# Patient Record
Sex: MALE | Race: WHITE | Employment: OTHER | URBAN - METROPOLITAN AREA
[De-identification: names, ages, dates, MRNs, and addresses within clinical notes are randomized per-mention and may not be internally consistent; named-entity substitution may affect disease eponyms.]

---

## 2020-12-07 ENCOUNTER — APPOINTMENT (OUTPATIENT)
Dept: GENERAL RADIOLOGY | Facility: HOSPITAL | Age: 65
End: 2020-12-07
Attending: EMERGENCY MEDICINE
Payer: COMMERCIAL

## 2020-12-07 ENCOUNTER — HOSPITAL ENCOUNTER (EMERGENCY)
Facility: HOSPITAL | Age: 65
Discharge: HOME | End: 2020-12-07
Attending: EMERGENCY MEDICINE
Payer: COMMERCIAL

## 2020-12-07 ENCOUNTER — PATIENT OUTREACH (OUTPATIENT)
Dept: CASE MANAGEMENT | Age: 65
End: 2020-12-07

## 2020-12-07 ENCOUNTER — APPOINTMENT (OUTPATIENT)
Dept: CT IMAGING | Facility: HOSPITAL | Age: 65
End: 2020-12-07
Attending: EMERGENCY MEDICINE
Payer: COMMERCIAL

## 2020-12-07 VITALS
RESPIRATION RATE: 17 BRPM | OXYGEN SATURATION: 95 % | TEMPERATURE: 100 F | BODY MASS INDEX: 28.88 KG/M2 | WEIGHT: 225 LBS | HEIGHT: 74 IN | DIASTOLIC BLOOD PRESSURE: 70 MMHG | SYSTOLIC BLOOD PRESSURE: 126 MMHG | HEART RATE: 96 BPM

## 2020-12-07 DIAGNOSIS — U07.1 PNEUMONIA DUE TO COVID-19 VIRUS: Primary | ICD-10-CM

## 2020-12-07 DIAGNOSIS — J12.82 PNEUMONIA DUE TO COVID-19 VIRUS: Primary | ICD-10-CM

## 2020-12-07 PROCEDURE — 99285 EMERGENCY DEPT VISIT HI MDM: CPT

## 2020-12-07 PROCEDURE — 96361 HYDRATE IV INFUSION ADD-ON: CPT

## 2020-12-07 PROCEDURE — 82728 ASSAY OF FERRITIN: CPT | Performed by: EMERGENCY MEDICINE

## 2020-12-07 PROCEDURE — 71275 CT ANGIOGRAPHY CHEST: CPT | Performed by: EMERGENCY MEDICINE

## 2020-12-07 PROCEDURE — 83615 LACTATE (LD) (LDH) ENZYME: CPT | Performed by: EMERGENCY MEDICINE

## 2020-12-07 PROCEDURE — 85025 COMPLETE CBC W/AUTO DIFF WBC: CPT | Performed by: EMERGENCY MEDICINE

## 2020-12-07 PROCEDURE — 71045 X-RAY EXAM CHEST 1 VIEW: CPT | Performed by: EMERGENCY MEDICINE

## 2020-12-07 PROCEDURE — 96360 HYDRATION IV INFUSION INIT: CPT

## 2020-12-07 PROCEDURE — 86140 C-REACTIVE PROTEIN: CPT | Performed by: EMERGENCY MEDICINE

## 2020-12-07 PROCEDURE — 84145 PROCALCITONIN (PCT): CPT | Performed by: EMERGENCY MEDICINE

## 2020-12-07 PROCEDURE — 80053 COMPREHEN METABOLIC PANEL: CPT | Performed by: EMERGENCY MEDICINE

## 2020-12-07 PROCEDURE — 99284 EMERGENCY DEPT VISIT MOD MDM: CPT

## 2020-12-07 RX ORDER — OMEPRAZOLE 20 MG/1
20 CAPSULE, DELAYED RELEASE ORAL NIGHTLY
COMMUNITY
End: 2021-06-10

## 2020-12-07 RX ORDER — ACETAMINOPHEN 500 MG
1000 TABLET ORAL ONCE
Status: COMPLETED | OUTPATIENT
Start: 2020-12-07 | End: 2020-12-07

## 2020-12-07 NOTE — PROGRESS NOTES
Home Monitoring Condition Update    Covid19+ test date: 12/1/2020      Consent Verification:  Assessment Completed With: Patient  HIPAA Verified?   Yes    COVID-19 HOME MONITORING 12/7/2020   Short of breath Yes   Change in shortness of breath Same   [facility] monitoring. Patient advised to inform their Employee Health department or Manager when they have tested positive for COVID-19.       The patient was also directed to continue to isolate away from other household members when possible and stay completely

## 2020-12-07 NOTE — ED INITIAL ASSESSMENT (HPI)
Pt to ED via EMS from home c/o SOB, cough r/t Positive COVID after Thanksgiving. PT arrives on 2 L NC report from EMS 92% O2sat on RA. NKDA. Denies CP.

## 2020-12-07 NOTE — ED PROVIDER NOTES
Patient Seen in: [facility] Emergency Department      History   Patient presents with:  Difficulty Breathing  Cough/URI  Covid    Stated Complaint: SOn and cough r/t Positive COVID    HPI    19-year-old with a history of GERD presents for evaluation BMI 28.89 kg/m²         Physical Exam    General: Patient is awake, alert in no acute distress. HEENT:  Sclera are not icteric. Conjunctivae within normal limits.     Cardiovascular: Regular rate and rhythm  Respiratory: Occasional cough, no conversatio pneumonia  CTA chest: No PE. Dense consolidation both lung bases additional diffuse patchy groundglass opacities throughout both lungs. Consistent with multifocal pneumonia including COVID-19.      Patient did become dyspneic with exertion however was not

## 2020-12-07 NOTE — ED NOTES
[facility] CALLED FOR TRANSPORT BACK HOME, ETA IS 1145 DUE TO + COVID AND WILL NEED A BLS AT A MEDICARE RATE PER [facility], RN INFORMED

## 2020-12-07 NOTE — PATIENT INSTRUCTIONS
Patient Instructions for Home Pulse Oximetry due to COVID-19 Pneumonia  Overview and Terminology  A pulse oximeter a small device that clips on a finger and measures oxygen saturation levels along with your heart rate.  It works by passing small beams of [id number]         [facility] Monitoring Program    Your provider has placed an order for the [address] Monitoring program.  Silvestre Hammonds will be receiving a follow up phone call during business hours, soon after discharge.   If

## 2020-12-08 ENCOUNTER — TELEMEDICINE (OUTPATIENT)
Dept: INTERNAL MEDICINE CLINIC | Facility: CLINIC | Age: 65
End: 2020-12-08
Payer: COMMERCIAL

## 2020-12-08 DIAGNOSIS — U07.1 COVID-19: Primary | ICD-10-CM

## 2020-12-08 PROCEDURE — 99203 OFFICE O/P NEW LOW 30 MIN: CPT | Performed by: CLINICAL NURSE SPECIALIST

## 2020-12-08 NOTE — PROGRESS NOTES
Video Visit  [employer]    Telehealth outside of [address] Verbal Consent   I conducted a telehealth visit with Nicanor Levi today, 12/08/20, which was completed using two-way, real-time interactive audio and vid Diagnosis:  COVID 19 PNA. Arloa Lanes was discharge with a referral to the [facility] for continued follow up.   Today, the patient states he continues to feel fatigued, has a productive cough of clear colored sputum, has a pulse ox reading of 95% on RA and is resolution. If clinical symptoms persist then consider CT.     Dictated by (CST): Honey Olivia MD on 12/07/2020 at 8:08 AM     Finalized by (CST): Honey Olivia MD on 12/07/2020 at 8:08 AM         Lab Results   Component Value Date/Time    WBC 6.5 12/07/2020 07 10/17/1958  Annual Depression Screen due on 10/17/1969  FIT Colorectal Screening due on 10/17/2007  Colonoscopy due on 10/17/2007  PSA due on 10/17/2007  Zoster Vaccines(1 of 2) due on 10/17/2007  Influenza Vaccine(1) due on 10/01/2020  Pneumococcal Vaccin [place]  [facility]/ [facility], [place] [id number]  [address], APRN, 12/8/2020  [address] [place] [address]  [phone]

## 2020-12-08 NOTE — PROGRESS NOTES
TRANSITIONAL CARE CLINIC PHARMACIST MEDICATION RECONCILIATION        Jessica Beck MRN [id number]    10/17/1955 PCP No primary care provider on file.        Comments: Medication history completed by the [address] [place] [address] Pharmacist with the [facility]

## 2020-12-09 ENCOUNTER — PATIENT OUTREACH (OUTPATIENT)
Dept: CASE MANAGEMENT | Age: 65
End: 2020-12-09

## 2020-12-10 ENCOUNTER — PATIENT OUTREACH (OUTPATIENT)
Dept: CASE MANAGEMENT | Age: 65
End: 2020-12-10

## 2020-12-10 NOTE — PROGRESS NOTES
Home Monitoring Condition Update    Covid19+ test date: 12/1/20      Consent Verification:  Assessment Completed With: Patient  HIPAA Verified?   Yes    COVID-19 HOME MONITORING 12/10/2020   Temperature 97   Reading From Ear   SPO2 95   Pulse taken from P inform their Employee Health department or Manager when they have tested positive for COVID-19.       The patient was also directed to continue to isolate away from other household members when possible and stay completely isolated from the general public 3

## 2020-12-11 ENCOUNTER — PATIENT OUTREACH (OUTPATIENT)
Dept: CASE MANAGEMENT | Age: 65
End: 2020-12-11

## 2020-12-11 NOTE — PROGRESS NOTES
Patient called [facility] back and left voicemail. Called patient and spoke with him. Patient was asking about isolation, contagious period and returning to work.   Advised patient of current CDC guidelines  Also advised patient that he can be cl

## 2020-12-11 NOTE — PROGRESS NOTES
Home Monitoring Condition Update    Covid19+ test date: 12/1/2020      Consent Verification:  Assessment Completed With: Patient  HIPAA Verified?   Yes    COVID-19 HOME MONITORING 12/11/2020   Temperature (No Data)   Reading From -   SPO2 97   Pulse 50 inform their Employee Health department or Manager when they have tested positive for COVID-19.       The patient was also directed to continue to isolate away from other household members when possible and stay completely isolated from the general public 3

## 2020-12-14 ENCOUNTER — PATIENT OUTREACH (OUTPATIENT)
Dept: CASE MANAGEMENT | Age: 65
End: 2020-12-14

## 2020-12-14 NOTE — PROGRESS NOTES
Home Monitoring Condition Update    Covid19+ test date: 12/1/20      Consent Verification:  Assessment Completed With: Patient  HIPAA Verified?   Yes    COVID-19 HOME MONITORING 12/14/2020   Temperature 98   Reading From Ear   SPO2 98   Pulse 51   Pulse t inform their Employee Health department or Manager when they have tested positive for COVID-19.       The patient was also directed to continue to isolate away from other household members when possible and stay completely isolated from the general public 3

## 2020-12-15 ENCOUNTER — PATIENT OUTREACH (OUTPATIENT)
Dept: CASE MANAGEMENT | Age: 65
End: 2020-12-15

## 2020-12-15 NOTE — PROGRESS NOTES
Home Monitoring Condition Update    Covid19+ test date: 12/1/2020      Consent Verification:  Assessment Completed With: Patient  HIPAA Verified?   Yes    COVID-19 HOME MONITORING 12/15/2020   Temperature (No Data)   Reading From -   SPO2 98   Pulse 63 their Employee Health department or Manager when they have tested positive for COVID-19.       The patient was also directed to continue to isolate away from other household members when possible and stay completely isolated from the general public 3 days a

## 2020-12-16 ENCOUNTER — TELEMEDICINE (OUTPATIENT)
Dept: INTERNAL MEDICINE CLINIC | Facility: CLINIC | Age: 65
End: 2020-12-16
Payer: COMMERCIAL

## 2020-12-16 ENCOUNTER — TELEPHONE (OUTPATIENT)
Dept: CASE MANAGEMENT | Age: 65
End: 2020-12-16

## 2020-12-16 ENCOUNTER — TELEPHONE (OUTPATIENT)
Dept: INTERNAL MEDICINE CLINIC | Facility: CLINIC | Age: 65
End: 2020-12-16

## 2020-12-16 DIAGNOSIS — U07.1 PNEUMONIA DUE TO COVID-19 VIRUS: Primary | ICD-10-CM

## 2020-12-16 DIAGNOSIS — J12.82 PNEUMONIA DUE TO COVID-19 VIRUS: Primary | ICD-10-CM

## 2020-12-16 PROCEDURE — 99213 OFFICE O/P EST LOW 20 MIN: CPT | Performed by: INTERNAL MEDICINE

## 2020-12-16 NOTE — TELEPHONE ENCOUNTER
Patient had virtual visit today.      Please advise if patient is to continue home monitoring program (and indicate frequency of calls - daily, every other day, etc) and duration of days to be contacted and when next virtual visit should be scheduled for st

## 2020-12-16 NOTE — TELEPHONE ENCOUNTER
Please assist in routing to [facility] Home Monitoring Pool\" as I cannot direct myself:    No further follow-up with patient necessary.

## 2020-12-16 NOTE — TELEPHONE ENCOUNTER
Patient had a doxemity appointment with Dr Lexi Stevens today  He needs a note to return to work today  Please advise

## 2020-12-17 ENCOUNTER — TELEPHONE (OUTPATIENT)
Dept: CASE MANAGEMENT | Age: 65
End: 2020-12-17

## 2020-12-17 ENCOUNTER — TELEPHONE (OUTPATIENT)
Dept: INTERNAL MEDICINE CLINIC | Facility: CLINIC | Age: 65
End: 2020-12-17

## 2020-12-17 NOTE — TELEPHONE ENCOUNTER
Pt called [facility], [address] Dept inquiring about return to work letter that was supposed to be sent to him via [address] Pt was advised this is not Dr. Dean Xiao office, was given the correct phone number and was transferred.

## 2020-12-17 NOTE — TELEPHONE ENCOUNTER
Message          ----- Message -----   From: Edita Shaw   Sent: 12/16/2020   8:23 PM CST   To: [employer] Customer Response Pool   Subject: Work Release                                       Topic: Other      I need a release to return to work dated CADEN Alba

## 2021-06-10 PROBLEM — R22.42 LOCALIZED SWELLING OF LEFT LOWER LEG: Status: ACTIVE | Noted: 2021-06-10

## 2021-06-10 PROBLEM — K58.0 IRRITABLE BOWEL SYNDROME WITH DIARRHEA: Status: ACTIVE | Noted: 2021-06-10

## 2021-06-10 PROBLEM — Z00.00 ROUTINE GENERAL MEDICAL EXAMINATION AT A HEALTH CARE FACILITY: Status: ACTIVE | Noted: 2021-06-10

## 2021-06-10 PROBLEM — G47.00 INSOMNIA, UNSPECIFIED TYPE: Status: ACTIVE | Noted: 2021-06-10

## 2021-06-10 PROBLEM — K21.9 GASTROESOPHAGEAL REFLUX DISEASE WITHOUT ESOPHAGITIS: Status: ACTIVE | Noted: 2021-06-10

## 2021-06-27 ENCOUNTER — HOSPITAL ENCOUNTER (EMERGENCY)
Age: 66
Discharge: HOME | End: 2021-06-27
Attending: EMERGENCY MEDICINE
Payer: COMMERCIAL

## 2021-06-27 VITALS
BODY MASS INDEX: 30.8 KG/M2 | HEART RATE: 55 BPM | RESPIRATION RATE: 16 BRPM | TEMPERATURE: 97 F | WEIGHT: 240 LBS | OXYGEN SATURATION: 97 % | HEIGHT: 74 IN | DIASTOLIC BLOOD PRESSURE: 68 MMHG | SYSTOLIC BLOOD PRESSURE: 152 MMHG

## 2021-06-27 VITALS
DIASTOLIC BLOOD PRESSURE: 74 MMHG | TEMPERATURE: 98 F | OXYGEN SATURATION: 98 % | BODY MASS INDEX: 30.8 KG/M2 | SYSTOLIC BLOOD PRESSURE: 151 MMHG | HEART RATE: 56 BPM | WEIGHT: 240 LBS | HEIGHT: 74 IN | RESPIRATION RATE: 16 BRPM

## 2021-06-27 DIAGNOSIS — K08.89 PAIN, DENTAL: Primary | ICD-10-CM

## 2021-06-27 PROCEDURE — 99283 EMERGENCY DEPT VISIT LOW MDM: CPT

## 2021-06-27 PROCEDURE — 64450 NJX AA&/STRD OTHER PN/BRANCH: CPT

## 2021-06-27 RX ORDER — AMOXICILLIN 500 MG/1
500 TABLET, FILM COATED ORAL 3 TIMES DAILY
Qty: 30 TABLET | Refills: 0 | Status: SHIPPED | OUTPATIENT
Start: 2021-06-27 | End: 2021-07-07

## 2021-06-27 RX ORDER — HYDROCODONE BITARTRATE AND ACETAMINOPHEN 5; 325 MG/1; MG/1
1-2 TABLET ORAL EVERY 6 HOURS PRN
Qty: 10 TABLET | Refills: 0 | Status: SHIPPED | OUTPATIENT
Start: 2021-06-27 | End: 2021-06-27

## 2021-06-27 RX ORDER — HYDROCODONE BITARTRATE AND ACETAMINOPHEN 5; 325 MG/1; MG/1
1 TABLET ORAL ONCE
Status: COMPLETED | OUTPATIENT
Start: 2021-06-27 | End: 2021-06-27

## 2021-06-27 RX ORDER — HYDROCODONE BITARTRATE AND ACETAMINOPHEN 5; 325 MG/1; MG/1
1-2 TABLET ORAL EVERY 6 HOURS PRN
Qty: 10 TABLET | Refills: 0 | Status: SHIPPED | OUTPATIENT
Start: 2021-06-27 | End: 2021-07-04

## 2021-06-27 NOTE — ED PROVIDER NOTES
Patient Seen in: THE [facility] Emergency Department In [place]      History   Patient presents with:  Dental Problem    Stated Complaint: pt seen here earlier today for a dental abscess, c/o worsening pain     HPI/Subjective:   HPI    30-year-old male compla crown is very tender to palpation there is minimal erythema around the alveolar mucosa. There is no external facial swelling the neck is supple is no nuchal rigidity lymphadenopathy.   Lungs are clear cardiovascular exam shows regular rate and rhythm witho

## 2021-06-27 NOTE — ED PROVIDER NOTES
Patient Seen in: THE [facility] Emergency Department In [place]      History   Patient presents with:  Dental Problem    Stated Complaint: tooth pain     HPI/Subjective:   HPI    63 with a history of GERD presents for evaluation of tooth pain.   4 months ago, intact. Sclera are not icteric. Conjunctivae within normal limits. No trismus. No stridor. Phonation is normal.  No swelling to the neck or face, no erythema to the neck or face.   There is tenderness to percussion to tooth #31 with some periapical deon

## 2021-09-28 PROBLEM — L30.9 ECZEMA, UNSPECIFIED TYPE: Status: ACTIVE | Noted: 2021-09-28

## 2021-11-30 PROBLEM — Z00.00 ROUTINE GENERAL MEDICAL EXAMINATION AT A HEALTH CARE FACILITY: Status: RESOLVED | Noted: 2021-06-10 | Resolved: 2021-11-30

## 2021-12-03 PROBLEM — J01.11 ACUTE RECURRENT FRONTAL SINUSITIS: Status: ACTIVE | Noted: 2021-12-03

## 2022-05-23 ENCOUNTER — HOSPITAL ENCOUNTER (EMERGENCY)
Facility: HOSPITAL | Age: 67
Discharge: HOME | End: 2022-05-23
Attending: EMERGENCY MEDICINE
Payer: COMMERCIAL

## 2022-05-23 ENCOUNTER — APPOINTMENT (OUTPATIENT)
Dept: MRI IMAGING | Facility: HOSPITAL | Age: 67
End: 2022-05-23
Attending: EMERGENCY MEDICINE
Payer: COMMERCIAL

## 2022-05-23 VITALS
HEIGHT: 74 IN | OXYGEN SATURATION: 98 % | RESPIRATION RATE: 21 BRPM | WEIGHT: 235 LBS | SYSTOLIC BLOOD PRESSURE: 143 MMHG | DIASTOLIC BLOOD PRESSURE: 85 MMHG | HEART RATE: 55 BPM | BODY MASS INDEX: 30.16 KG/M2 | TEMPERATURE: 98 F

## 2022-05-23 DIAGNOSIS — G45.4 TGA (TRANSIENT GLOBAL AMNESIA): Primary | ICD-10-CM

## 2022-05-23 LAB
ALBUMIN SERPL-MCNC: 3.8 G/DL (ref 3.4–5)
ALBUMIN/GLOB SERPL: 1.1 {RATIO} (ref 1–2)
ALP LIVER SERPL-CCNC: 76 U/L
ALT SERPL-CCNC: 43 U/L
ANION GAP SERPL CALC-SCNC: 6 MMOL/L (ref 0–18)
AST SERPL-CCNC: 32 U/L (ref 15–37)
BASOPHILS # BLD AUTO: 0.02 X10(3) UL (ref 0–0.2)
BASOPHILS NFR BLD AUTO: 0.3 %
BILIRUB SERPL-MCNC: 1.7 MG/DL (ref 0.1–2)
BUN BLD-MCNC: 15 MG/DL (ref 7–18)
CALCIUM BLD-MCNC: 9 MG/DL (ref 8.5–10.1)
CHLORIDE SERPL-SCNC: 111 MMOL/L (ref 98–112)
CO2 SERPL-SCNC: 22 MMOL/L (ref 21–32)
CREAT BLD-MCNC: 1.34 MG/DL
EOSINOPHIL # BLD AUTO: 0.04 X10(3) UL (ref 0–0.7)
EOSINOPHIL NFR BLD AUTO: 0.7 %
ERYTHROCYTE [DISTWIDTH] IN BLOOD BY AUTOMATED COUNT: 12.6 %
GLOBULIN PLAS-MCNC: 3.4 G/DL (ref 2.8–4.4)
GLUCOSE BLD-MCNC: 112 MG/DL (ref 70–99)
GLUCOSE BLD-MCNC: 122 MG/DL (ref 70–99)
HCT VFR BLD AUTO: 42.5 %
HGB BLD-MCNC: 15.1 G/DL
IMM GRANULOCYTES # BLD AUTO: 0.02 X10(3) UL (ref 0–1)
IMM GRANULOCYTES NFR BLD: 0.3 %
LYMPHOCYTES # BLD AUTO: 1.25 X10(3) UL (ref 1–4)
LYMPHOCYTES NFR BLD AUTO: 20.5 %
MCH RBC QN AUTO: 32.2 PG (ref 26–34)
MCHC RBC AUTO-ENTMCNC: 35.5 G/DL (ref 31–37)
MCV RBC AUTO: 90.6 FL
MONOCYTES # BLD AUTO: 0.47 X10(3) UL (ref 0.1–1)
MONOCYTES NFR BLD AUTO: 7.7 %
NEUTROPHILS # BLD AUTO: 4.29 X10 (3) UL (ref 1.5–7.7)
NEUTROPHILS # BLD AUTO: 4.29 X10(3) UL (ref 1.5–7.7)
NEUTROPHILS NFR BLD AUTO: 70.5 %
OSMOLALITY SERPL CALC.SUM OF ELEC: 290 MOSM/KG (ref 275–295)
PLATELET # BLD AUTO: 216 10(3)UL (ref 150–450)
POTASSIUM SERPL-SCNC: 3.4 MMOL/L (ref 3.5–5.1)
PROT SERPL-MCNC: 7.2 G/DL (ref 6.4–8.2)
RBC # BLD AUTO: 4.69 X10(6)UL
SODIUM SERPL-SCNC: 139 MMOL/L (ref 136–145)
WBC # BLD AUTO: 6.1 X10(3) UL (ref 4–11)

## 2022-05-23 PROCEDURE — 99284 EMERGENCY DEPT VISIT MOD MDM: CPT

## 2022-05-23 PROCEDURE — 70553 MRI BRAIN STEM W/O & W/DYE: CPT | Performed by: EMERGENCY MEDICINE

## 2022-05-23 PROCEDURE — A9575 INJ GADOTERATE MEGLUMI 0.1ML: HCPCS | Performed by: EMERGENCY MEDICINE

## 2022-05-23 PROCEDURE — 99285 EMERGENCY DEPT VISIT HI MDM: CPT

## 2022-05-23 PROCEDURE — 80053 COMPREHEN METABOLIC PANEL: CPT | Performed by: EMERGENCY MEDICINE

## 2022-05-23 PROCEDURE — 82962 GLUCOSE BLOOD TEST: CPT

## 2022-05-23 PROCEDURE — 36415 COLL VENOUS BLD VENIPUNCTURE: CPT

## 2022-05-23 PROCEDURE — 85025 COMPLETE CBC W/AUTO DIFF WBC: CPT | Performed by: EMERGENCY MEDICINE
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
## 2022-05-24 NOTE — ED QUICK NOTES
Report from Vitaliy Plascencia RN. Pt back to room from MRI. He is resting in bed. Pt denies pain, he denies ROME.  Family at bedside

## 2022-05-31 ENCOUNTER — PATIENT OUTREACH (OUTPATIENT)
Dept: CASE MANAGEMENT | Age: 67
End: 2022-05-31

## 2022-05-31 NOTE — PROGRESS NOTES
Someone called on behalf of pt, did not state who she was but requesting assistance with scheduling f/up appts. Please call back.

## 2022-06-01 ENCOUNTER — TELEPHONE (OUTPATIENT)
Dept: SURGERY | Facility: CLINIC | Age: 67
End: 2022-06-01

## 2022-06-01 NOTE — PROGRESS NOTES
Returned VM but was unable to contact. LMTCB if assistance is still needed on patients number and on wifes number.

## 2022-06-01 NOTE — TELEPHONE ENCOUNTER
Patient returned call, discussed symptoms and condition. Patient agreed with all information below given by wife Yvette Friend. Patient to see provider on 6/3/3033.

## 2022-06-01 NOTE — TELEPHONE ENCOUNTER
Olivia calling from THE [facility] to schedule appt for TIA follow up. Per Olivia pt discussed waiting to follow up due to THE [facility] day weekend. Pt is  scheduled for 6/3/22 at 9:10 with Dr. Gray Solano.
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
## 2022-06-01 NOTE — PROGRESS NOTES
Returned VM to patients spouse Naeem Joiner  [facility]  [address]  [phone]  Apt made for Fri. June 3rd @9:10am     [facility] notified

## 2022-06-03 ENCOUNTER — OFFICE VISIT (OUTPATIENT)
Dept: NEUROLOGY | Facility: CLINIC | Age: 67
End: 2022-06-03
Payer: COMMERCIAL

## 2022-06-03 VITALS
BODY MASS INDEX: 30.85 KG/M2 | HEIGHT: 74 IN | WEIGHT: 240.38 LBS | RESPIRATION RATE: 20 BRPM | HEART RATE: 63 BPM | SYSTOLIC BLOOD PRESSURE: 132 MMHG | DIASTOLIC BLOOD PRESSURE: 72 MMHG | OXYGEN SATURATION: 98 %

## 2022-06-03 DIAGNOSIS — R41.3 TRANSIENT AMNESIA: Primary | ICD-10-CM

## 2022-06-03 PROCEDURE — 3008F BODY MASS INDEX DOCD: CPT | Performed by: OTHER

## 2022-06-03 PROCEDURE — 99204 OFFICE O/P NEW MOD 45 MIN: CPT | Performed by: OTHER

## 2022-06-03 PROCEDURE — 3075F SYST BP GE 130 - 139MM HG: CPT | Performed by: OTHER

## 2022-06-03 PROCEDURE — 3078F DIAST BP <80 MM HG: CPT | Performed by: OTHER

## 2022-06-03 RX ORDER — FLUTICASONE PROPIONATE 50 MCG
2 SPRAY, SUSPENSION (ML) NASAL DAILY
COMMUNITY
Start: 2022-04-20 | End: 2022-11-16

## 2022-06-27 ENCOUNTER — TELEPHONE (OUTPATIENT)
Dept: NEUROLOGY | Facility: CLINIC | Age: 67
End: 2022-06-27

## 2022-07-07 ENCOUNTER — TELEPHONE (OUTPATIENT)
Dept: NEUROLOGY | Facility: CLINIC | Age: 67
End: 2022-07-07

## 2022-07-07 NOTE — TELEPHONE ENCOUNTER
Rec'tj Neuropsychological Evaluation, from [facility], dated 7/7/22; placed in nurses bin for review

## 2022-08-10 ENCOUNTER — OFFICE VISIT (OUTPATIENT)
Dept: NEUROLOGY | Facility: CLINIC | Age: 67
End: 2022-08-10
Payer: COMMERCIAL

## 2022-08-10 VITALS
DIASTOLIC BLOOD PRESSURE: 82 MMHG | BODY MASS INDEX: 31 KG/M2 | RESPIRATION RATE: 16 BRPM | HEART RATE: 64 BPM | SYSTOLIC BLOOD PRESSURE: 128 MMHG | WEIGHT: 240 LBS

## 2022-08-10 DIAGNOSIS — R41.3 TRANSIENT AMNESIA: Primary | ICD-10-CM

## 2022-08-10 DIAGNOSIS — F34.1 DYSTHYMIC DISORDER: ICD-10-CM

## 2022-08-10 PROCEDURE — 3074F SYST BP LT 130 MM HG: CPT | Performed by: OTHER

## 2022-08-10 PROCEDURE — 3079F DIAST BP 80-89 MM HG: CPT | Performed by: OTHER

## 2022-08-10 PROCEDURE — 99213 OFFICE O/P EST LOW 20 MIN: CPT | Performed by: OTHER

## 2022-08-10 RX ORDER — BUPROPION HYDROCHLORIDE 150 MG/1
150 TABLET ORAL DAILY
Qty: 30 TABLET | Refills: 2 | Status: SHIPPED | OUTPATIENT
Start: 2022-08-10

## (undated) NOTE — LETTER
Date: 12/16/2020    Patient Name: Maribel Dumont          To Whom it may concern: This letter has been written at the patient's request. The above patient was seen at the [facility] for treatment of a medical condition.     This patient has